# Patient Record
Sex: MALE | Race: OTHER | URBAN - METROPOLITAN AREA
[De-identification: names, ages, dates, MRNs, and addresses within clinical notes are randomized per-mention and may not be internally consistent; named-entity substitution may affect disease eponyms.]

---

## 2017-06-26 ENCOUNTER — OFFICE VISIT (OUTPATIENT)
Dept: FAMILY MEDICINE CLINIC | Facility: CLINIC | Age: 52
End: 2017-06-26

## 2017-06-26 VITALS
SYSTOLIC BLOOD PRESSURE: 136 MMHG | WEIGHT: 221 LBS | DIASTOLIC BLOOD PRESSURE: 84 MMHG | TEMPERATURE: 98 F | RESPIRATION RATE: 16 BRPM | HEART RATE: 66 BPM | OXYGEN SATURATION: 98 % | BODY MASS INDEX: 36 KG/M2

## 2017-06-26 DIAGNOSIS — R30.0 DYSURIA: ICD-10-CM

## 2017-06-26 DIAGNOSIS — R10.32 LLQ ABDOMINAL PAIN: Primary | ICD-10-CM

## 2017-06-26 PROCEDURE — 99213 OFFICE O/P EST LOW 20 MIN: CPT | Performed by: NURSE PRACTITIONER

## 2017-06-26 PROCEDURE — 81003 URINALYSIS AUTO W/O SCOPE: CPT | Performed by: NURSE PRACTITIONER

## 2017-06-26 PROCEDURE — 87086 URINE CULTURE/COLONY COUNT: CPT | Performed by: NURSE PRACTITIONER

## 2017-06-26 NOTE — PATIENT INSTRUCTIONS
Uncertain Causes of Abdominal Pain (Male)  Based on your visit today, the exact cause of your abdominal pain is not clear. Your exam and tests do not suggest a dangerous cause at this time.  However, the signs of a serious problem may take more time to ap · Watch closely for anything that may make your symptoms worse or better. Pay close attention to symptoms below that may mean your condition is getting worse.   Follow-up care  Follow up with your healthcare provider if your symptoms are not improving, or a

## 2017-06-26 NOTE — PROGRESS NOTES
CHIEF COMPLAINT:   Patient presents with:  Abdominal Pain: x3 days, mild/improving, mild urinary symptoms as well.       HPI:   Pat Frost is a 46year old male who presents with symptoms of LLQ pain- occasionally radiates to his left low ba EXAM:   /84   Pulse 66   Temp 97.7 °F (36.5 °C) (Oral)   Resp 16   Wt 221 lb   SpO2 98%   BMI 35.67 kg/m²   GENERAL: Non toxic, well developed, well nourished, and in no apparent distress  CARDIO: RRR, no murmurs  LUNGS: clear to ausculation bilatera - Advised F/U visit with PCP or IC if no continued improvement/worsening within 2-3 days. Advised ER immediately if any worsening pain in abdomen/low back or new fevers. - Pt verbalizes understanding and is agreeable w/ plan.     Meds & Refills for this V · If you have diarrhea, it may help to avoid dairy (lactose) for the time being. A low fat, low fiber diet can also help. · Eating several small meals per day as opposed to 2 or 3 larger meals may help. · Avoid dehydration.  Make sure to drink plenty of w